# Patient Record
Sex: FEMALE | Race: WHITE | Employment: PART TIME | URBAN - METROPOLITAN AREA
[De-identification: names, ages, dates, MRNs, and addresses within clinical notes are randomized per-mention and may not be internally consistent; named-entity substitution may affect disease eponyms.]

---

## 2021-05-07 ENCOUNTER — OFFICE VISIT (OUTPATIENT)
Dept: PRIMARY CARE CLINIC | Age: 21
End: 2021-05-07

## 2021-05-07 VITALS
TEMPERATURE: 97.6 F | WEIGHT: 146 LBS | SYSTOLIC BLOOD PRESSURE: 133 MMHG | OXYGEN SATURATION: 99 % | HEIGHT: 63 IN | DIASTOLIC BLOOD PRESSURE: 89 MMHG | HEART RATE: 86 BPM | BODY MASS INDEX: 25.87 KG/M2

## 2021-05-07 DIAGNOSIS — U07.1 COVID-19: Primary | ICD-10-CM

## 2021-05-07 LAB
Lab: ABNORMAL
PERFORMING INSTRUMENT: ABNORMAL
QC PASS/FAIL: ABNORMAL
SARS-COV-2, POC: DETECTED

## 2021-05-07 PROCEDURE — 87426 SARSCOV CORONAVIRUS AG IA: CPT | Performed by: PHYSICIAN ASSISTANT

## 2021-05-07 PROCEDURE — 99213 OFFICE O/P EST LOW 20 MIN: CPT | Performed by: PHYSICIAN ASSISTANT

## 2021-05-07 RX ORDER — DESOGESTREL AND ETHINYL ESTRADIOL 0.15-0.03
KIT ORAL
COMMUNITY
Start: 2021-05-06

## 2021-05-07 NOTE — LETTER
[address] [place] [address]  [place], [address]   Phone: [phone]  Fax: [phone]    Giorgi Burt. Yue Galeas PA-C      5/7/2021     Patient: Allan Muñoz   YOB: 2000       To Whom It May Concern: It is my medical opinion that Allan Muñoz should self-quarantine away from work and the [facility] [employer] as they tested positive for COVID-19 in our office today. Return to work with no retesting should be followed if meets these 3 criteria as outlined by CDC/ODH:     a. No fever without the use of fever reducers for 24 hours  b. Improvement in symptoms  c. At least 10 days since the onset of symptoms (5/12/21). If you have any questions or concerns, please don't hesitate to call. Sincerely,        Giorgi Burt.  DUANE Vasquez

## 2021-05-07 NOTE — PROGRESS NOTES
Chief Complaint   Headache (sx started sunday), Generalized Body Aches (Mucinex 8 hours ago), Diarrhea, Other (loss of taste and smell), and Nasal Congestion (rhinorrhea, sore throat)      History of Present Illness   Source of history provided by: patient. Joe Guardian is a 24 y.o. old female who has a past medical history of: History reviewed. No pertinent past medical history. Presents to the walk in clinic for evaluation of headaches, generalized body aches, diarrhea, loss of taste/smell, nasal congestion, rhinorrhea, and sore throat x 6 days. Denies any fever, chills, CP, dyspnea, LE edema, abdominal pain, vomiting, rash, or lethargy. Denies any hx of asthma or tobacco use. Pt denies any known exposure to COVID-19. Pt has not been vaccinated for COVID-19. Pt has been taking Mucinex OTC with minimal symptomatic relief. ROS   Pertinent positives and negatives are stated within HPI, all other systems reviewed and are negative. Surgical History:  has no past surgical history on file. Social History:  reports that she has never smoked. She has never used smokeless tobacco. She reports that she does not drink alcohol or use drugs. Family History: family history is not on file. Allergies: Patient has no known allergies. Physical Exam      VS:  /89   Pulse 86   Temp 97.6 °F (36.4 °C)   Ht 5' 3\" (1.6 m)   Wt 146 lb (66.2 kg)   SpO2 99%   BMI 25.86 kg/m²    Oxygen Saturation Interpretation: Normal.    Constitutional:  Alert, development consistent with age. NAD. Head:  NC/NT. Airway patent. Mouth: Posterior pharynx with mild erythema and clear postnasal drip. No tonsillar hypertrophy or exudate. Neck:  Normal ROM. Supple. No anterior cervical adenopathy noted. Lungs: CTAB without wheezes, rales, or rhonchi. CV:  Regular rate and rhythm, normal heart sounds, without pathological murmurs, ectopy, gallops, or rubs. Skin:  Normal turgor. Warm, dry, without visible rash. Lymphatic: No lymphangitis or adenopathy noted. Neurological:  Oriented. Motor functions intact. Lab / Imaging Results   (All laboratory and radiology results have been personally reviewed by myself)  Labs:  No results found for this visit on 05/07/21. Imaging: All Radiology results interpreted by Radiologist unless otherwise noted. No results found. Medical Decision Making   Pt non-toxic, in no apparent distress and stable at time of discharge. Assessment/Plan   Tina Gary was seen today for headache, generalized body aches, diarrhea, other and nasal congestion. Diagnoses and all orders for this visit:    COVID-19  -     POCT COVID-19, Antigen      Rapid COVID-19 testing is positive in office. Advised strict 10 day quarantine from start of illness. Pt should remain out of school, work, and the general public for at least 10 days from the start of symptoms. Pt should also be fever free for 24 hours and symptoms should be improved overall prior to returning. Increase fluids and rest. Symptomatic relief discussed including Tylenol prn pain/fever. Schedule virtual f/u with PCP in 7-10 days if symptoms persist. ED sooner if symptoms worsen or change. ED immediately with high or refractory fever, progressive SOB, dyspnea, CP, calf pain/swelling, shaking chills, vomiting, abdominal pain, lethargy, flank pain, or decreased urinary output. Pt verbalizes understanding and is in agreement with plan of care. All questions answered. Chayo Vasquez PA-C    This visit was provided as a focused evaluation during the COVID -19 pandemic/national emergency. A comprehensive review of all previous patient history and testing was not conducted. Pertinent findings were elicited during the visit.

## 2021-06-13 ENCOUNTER — HOSPITAL ENCOUNTER (EMERGENCY)
Age: 21
Discharge: HOME | End: 2021-06-13
Attending: FAMILY MEDICINE
Payer: COMMERCIAL

## 2021-06-13 VITALS
BODY MASS INDEX: 24.75 KG/M2 | HEIGHT: 64 IN | DIASTOLIC BLOOD PRESSURE: 64 MMHG | WEIGHT: 145 LBS | OXYGEN SATURATION: 98 % | TEMPERATURE: 97.7 F | SYSTOLIC BLOOD PRESSURE: 112 MMHG | RESPIRATION RATE: 16 BRPM | HEART RATE: 77 BPM

## 2021-06-13 DIAGNOSIS — L03.116 CELLULITIS OF LEFT LOWER EXTREMITY: Primary | ICD-10-CM

## 2021-06-13 DIAGNOSIS — I80.9 PHLEBITIS: ICD-10-CM

## 2021-06-13 LAB
BASOPHILS ABSOLUTE: 0.03 E9/L (ref 0–0.2)
BASOPHILS RELATIVE PERCENT: 0.3 % (ref 0–2)
EOSINOPHILS ABSOLUTE: 0.22 E9/L (ref 0.05–0.5)
EOSINOPHILS RELATIVE PERCENT: 2.2 % (ref 0–6)
HCT VFR BLD CALC: 37.9 % (ref 34–48)
HEMOGLOBIN: 13.5 G/DL (ref 11.5–15.5)
IMMATURE GRANULOCYTES #: 0.03 E9/L
IMMATURE GRANULOCYTES %: 0.3 % (ref 0–5)
LYMPHOCYTES ABSOLUTE: 1.53 E9/L (ref 1.5–4)
LYMPHOCYTES RELATIVE PERCENT: 15.2 % (ref 20–42)
MCH RBC QN AUTO: 33 PG (ref 26–35)
MCHC RBC AUTO-ENTMCNC: 35.6 % (ref 32–34.5)
MCV RBC AUTO: 92.7 FL (ref 80–99.9)
MONOCYTES ABSOLUTE: 0.87 E9/L (ref 0.1–0.95)
MONOCYTES RELATIVE PERCENT: 8.7 % (ref 2–12)
NEUTROPHILS ABSOLUTE: 7.36 E9/L (ref 1.8–7.3)
NEUTROPHILS RELATIVE PERCENT: 73.3 % (ref 43–80)
PDW BLD-RTO: 12 FL (ref 11.5–15)
PLATELET # BLD: 165 E9/L (ref 130–450)
PMV BLD AUTO: 10.9 FL (ref 7–12)
RBC # BLD: 4.09 E12/L (ref 3.5–5.5)
WBC # BLD: 10 E9/L (ref 4.5–11.5)

## 2021-06-13 PROCEDURE — 2580000003 HC RX 258: Performed by: FAMILY MEDICINE

## 2021-06-13 PROCEDURE — 96374 THER/PROPH/DIAG INJ IV PUSH: CPT

## 2021-06-13 PROCEDURE — 6360000002 HC RX W HCPCS: Performed by: FAMILY MEDICINE

## 2021-06-13 PROCEDURE — 36415 COLL VENOUS BLD VENIPUNCTURE: CPT

## 2021-06-13 PROCEDURE — 99283 EMERGENCY DEPT VISIT LOW MDM: CPT

## 2021-06-13 PROCEDURE — 85025 COMPLETE CBC W/AUTO DIFF WBC: CPT

## 2021-06-13 RX ORDER — NAPROXEN 500 MG/1
500 TABLET ORAL 2 TIMES DAILY
Qty: 20 TABLET | Refills: 0 | Status: SHIPPED | OUTPATIENT
Start: 2021-06-13 | End: 2021-06-23

## 2021-06-13 RX ORDER — CEPHALEXIN 500 MG/1
500 CAPSULE ORAL 4 TIMES DAILY
Qty: 40 CAPSULE | Refills: 0 | Status: SHIPPED | OUTPATIENT
Start: 2021-06-13 | End: 2021-06-23

## 2021-06-13 RX ORDER — SULFAMETHOXAZOLE AND TRIMETHOPRIM 800; 160 MG/1; MG/1
1 TABLET ORAL 2 TIMES DAILY
Qty: 14 TABLET | Refills: 0 | Status: SHIPPED | OUTPATIENT
Start: 2021-06-13 | End: 2021-06-20

## 2021-06-13 RX ADMIN — CEFTRIAXONE 1000 MG: 1 INJECTION, POWDER, FOR SOLUTION INTRAMUSCULAR; INTRAVENOUS at 15:38

## 2021-06-13 ASSESSMENT — PAIN DESCRIPTION - DESCRIPTORS: DESCRIPTORS: ACHING

## 2021-06-13 ASSESSMENT — PAIN DESCRIPTION - FREQUENCY: FREQUENCY: CONTINUOUS

## 2021-06-13 ASSESSMENT — PAIN DESCRIPTION - LOCATION: LOCATION: LEG

## 2021-06-13 ASSESSMENT — PAIN DESCRIPTION - PAIN TYPE: TYPE: ACUTE PAIN

## 2021-06-13 ASSESSMENT — PAIN DESCRIPTION - ORIENTATION: ORIENTATION: LEFT

## 2021-06-13 NOTE — ED PROVIDER NOTES
- 99.9 fL    MCH 33.0 26.0 - 35.0 pg    MCHC 35.6 (H) 32.0 - 34.5 %    RDW 12.0 11.5 - 15.0 fL    Platelets 653 757 - 058 E9/L    MPV 10.9 7.0 - 12.0 fL    Neutrophils % 73.3 43.0 - 80.0 %    Immature Granulocytes % 0.3 0.0 - 5.0 %    Lymphocytes % 15.2 (L) 20.0 - 42.0 %    Monocytes % 8.7 2.0 - 12.0 %    Eosinophils % 2.2 0.0 - 6.0 %    Basophils % 0.3 0.0 - 2.0 %    Neutrophils Absolute 7.36 (H) 1.80 - 7.30 E9/L    Immature Granulocytes # 0.03 E9/L    Lymphocytes Absolute 1.53 1.50 - 4.00 E9/L    Monocytes Absolute 0.87 0.10 - 0.95 E9/L    Eosinophils Absolute 0.22 0.05 - 0.50 E9/L    Basophils Absolute 0.03 0.00 - 0.20 E9/L       RADIOLOGY:  Interpreted by Radiologist.  No orders to display       ------------------------- NURSING NOTES AND VITALS REVIEWED ---------------------------   The nursing notes within the ED encounter and vital signs as below have been reviewed. /64   Pulse 77   Temp 97.7 °F (36.5 °C) (Temporal)   Resp 16   Ht 5' 4\" (1.626 m)   Wt 145 lb (65.8 kg)   SpO2 98%   BMI 24.89 kg/m²   Oxygen Saturation Interpretation: Normal      ---------------------------------------------------PHYSICAL EXAM--------------------------------------    Constitutional/General: Alert and oriented x3, well appearing, non toxic in NAD  Head: NC/AT  Eyes: PERRL, EOMI  Mouth: Oropharynx clear, handling secretions, no trismus  Neck: Supple, full ROM, no meningeal signs  Pulmonary: Lungs clear to auscultation bilaterally, no wheezes, rales, or rhonchi. Not in respiratory distress  Cardiovascular:  Regular rate and rhythm, no murmurs, gallops, or rubs. 2+ distal pulses  Abdomen: Soft, non tender, non distended,   Extremities: Moves all extremities x 4. Warm and well perfused; Left lower extremity:  There is an erythematous irregular shaped patch on the left upper anterior thigh and it is warm to touch and mildly tender to palpation.   On the distal lower extremity, the dorsal aspect of the foot is a more linear redness that is not warm to touch but is also tender to palpation. Skin: warm and dry;  Neurologic: GCS 15,  Psych: Normal Affect      ------------------------------ ED COURSE/MEDICAL DECISION MAKING----------------------  Medications   cefTRIAXone (ROCEPHIN) 1,000 mg in sterile water 10 mL IV syringe (1,000 mg Intravenous New Bag 6/13/21 7395)         Medical Decision Making:    Clinical findings today are concerning for cellulitis and phlebitis. She has a skin opening on the plantar aspect, proximal to the fourth toe on the left foot. There is no surrounding erythema and that opening. It is about half a centimeter in length. Patient will be given a dose of IV ceftriaxone and she will be prescribed antibiotics for home. Advised to keep the leg elevated and use warm compresses on and off and to return if any worsening symptoms or she develop any fever chills or body aches. Counseling: The emergency provider has spoken with the patient and discussed todays results, in addition to providing specific details for the plan of care and counseling regarding the diagnosis and prognosis. Questions are answered at this time and they are agreeable with the plan.      --------------------------------- IMPRESSION AND DISPOSITION ---------------------------------    IMPRESSION  1. Cellulitis of left lower extremity    2.  Phlebitis        DISPOSITION  Disposition: Discharge to home  Patient condition is stable                 Chhaya Barrientos MD  06/13/21 1302

## 2021-06-13 NOTE — ED NOTES
Discharge instructions given, patient verbalized their understanding, no other noted or stated problems at this time. Patient will follow up with primary doctor for care.      Estefania Claire RN  06/13/21 4364

## 2023-02-03 ENCOUNTER — HOSPITAL ENCOUNTER (EMERGENCY)
Age: 23
Discharge: HOME | End: 2023-02-03
Attending: EMERGENCY MEDICINE
Payer: COMMERCIAL

## 2023-02-03 ENCOUNTER — APPOINTMENT (OUTPATIENT)
Dept: CT IMAGING | Age: 23
End: 2023-02-03
Payer: COMMERCIAL

## 2023-02-03 VITALS
SYSTOLIC BLOOD PRESSURE: 154 MMHG | RESPIRATION RATE: 16 BRPM | OXYGEN SATURATION: 99 % | HEIGHT: 63 IN | WEIGHT: 144 LBS | TEMPERATURE: 98.3 F | DIASTOLIC BLOOD PRESSURE: 83 MMHG | HEART RATE: 78 BPM | BODY MASS INDEX: 25.52 KG/M2

## 2023-02-03 DIAGNOSIS — M54.2 NECK PAIN: ICD-10-CM

## 2023-02-03 DIAGNOSIS — R51.9 ACUTE NONINTRACTABLE HEADACHE, UNSPECIFIED HEADACHE TYPE: Primary | ICD-10-CM

## 2023-02-03 LAB — HCG(URINE) PREGNANCY TEST: NEGATIVE

## 2023-02-03 PROCEDURE — 96375 TX/PRO/DX INJ NEW DRUG ADDON: CPT

## 2023-02-03 PROCEDURE — 70450 CT HEAD/BRAIN W/O DYE: CPT

## 2023-02-03 PROCEDURE — 99284 EMERGENCY DEPT VISIT MOD MDM: CPT

## 2023-02-03 PROCEDURE — 81025 URINE PREGNANCY TEST: CPT

## 2023-02-03 PROCEDURE — 96374 THER/PROPH/DIAG INJ IV PUSH: CPT

## 2023-02-03 PROCEDURE — 6360000002 HC RX W HCPCS: Performed by: EMERGENCY MEDICINE

## 2023-02-03 PROCEDURE — 2580000003 HC RX 258: Performed by: EMERGENCY MEDICINE

## 2023-02-03 RX ORDER — PROCHLORPERAZINE EDISYLATE 5 MG/ML
10 INJECTION INTRAMUSCULAR; INTRAVENOUS ONCE
Status: COMPLETED | OUTPATIENT
Start: 2023-02-03 | End: 2023-02-03

## 2023-02-03 RX ORDER — KETOROLAC TROMETHAMINE 30 MG/ML
15 INJECTION, SOLUTION INTRAMUSCULAR; INTRAVENOUS ONCE
Status: DISCONTINUED | OUTPATIENT
Start: 2023-02-03 | End: 2023-02-03 | Stop reason: HOSPADM

## 2023-02-03 RX ORDER — CYCLOBENZAPRINE HCL 10 MG
10 TABLET ORAL 3 TIMES DAILY PRN
Qty: 21 TABLET | Refills: 0 | Status: SHIPPED | OUTPATIENT
Start: 2023-02-03 | End: 2023-02-13

## 2023-02-03 RX ORDER — NAPROXEN 250 MG/1
250 TABLET ORAL 2 TIMES DAILY
Qty: 14 TABLET | Refills: 0 | Status: SHIPPED | OUTPATIENT
Start: 2023-02-03 | End: 2023-02-10

## 2023-02-03 RX ORDER — DIPHENHYDRAMINE HYDROCHLORIDE 50 MG/ML
25 INJECTION INTRAMUSCULAR; INTRAVENOUS ONCE
Status: COMPLETED | OUTPATIENT
Start: 2023-02-03 | End: 2023-02-03

## 2023-02-03 RX ORDER — 0.9 % SODIUM CHLORIDE 0.9 %
1000 INTRAVENOUS SOLUTION INTRAVENOUS ONCE
Status: COMPLETED | OUTPATIENT
Start: 2023-02-03 | End: 2023-02-03

## 2023-02-03 RX ADMIN — DIPHENHYDRAMINE HYDROCHLORIDE 25 MG: 50 INJECTION, SOLUTION INTRAMUSCULAR; INTRAVENOUS at 17:13

## 2023-02-03 RX ADMIN — SODIUM CHLORIDE 1000 ML: 9 INJECTION, SOLUTION INTRAVENOUS at 17:12

## 2023-02-03 RX ADMIN — PROCHLORPERAZINE EDISYLATE 10 MG: 5 INJECTION INTRAMUSCULAR; INTRAVENOUS at 17:14

## 2023-02-03 ASSESSMENT — ENCOUNTER SYMPTOMS
EYE REDNESS: 0
VOMITING: 0
ABDOMINAL PAIN: 0
NAUSEA: 0
SHORTNESS OF BREATH: 0

## 2023-02-03 ASSESSMENT — PAIN DESCRIPTION - LOCATION: LOCATION: NECK

## 2023-02-03 ASSESSMENT — PAIN SCALES - GENERAL: PAINLEVEL_OUTOF10: 5
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
## 2024-02-29 DIAGNOSIS — Q21.0 VSD (VENTRICULAR SEPTAL DEFECT) (HHS-HCC): ICD-10-CM

## 2024-02-29 PROBLEM — R07.89 MUSCULOSKELETAL CHEST PAIN: Status: ACTIVE | Noted: 2024-02-29

## 2024-02-29 PROBLEM — R01.1 HEART MURMUR: Status: ACTIVE | Noted: 2024-02-29

## 2024-04-09 RX ORDER — DESOGESTREL AND ETHINYL ESTRADIOL 0.15-0.03
1 KIT ORAL DAILY
COMMUNITY
Start: 2024-02-21

## 2024-04-09 RX ORDER — CETIRIZINE HYDROCHLORIDE 10 MG/1
10 TABLET ORAL DAILY PRN
COMMUNITY
Start: 2024-01-29

## 2024-04-10 ENCOUNTER — TELEPHONE (OUTPATIENT)
Dept: PEDIATRIC CARDIOLOGY | Facility: HOSPITAL | Age: 24
End: 2024-04-10
Payer: COMMERCIAL

## 2024-04-10 NOTE — TELEPHONE ENCOUNTER
04/10/24 at 3:29 PM     Spoke with: Patient   [phone]     Confirmed upcoming appointment as follows:  Dr. Worthy  Location:  [place]  Date: 4/12  Time: echo at 3 and appointment at 4    Reviewed medications and allergies.   Patient confirmed understanding of appointment details, no further questions or issues to be addressed. Encouraged reaching out if there was anything else needed.      - Lily Schrader RN  [facility] Patient Navigator  [phone]

## 2024-04-10 NOTE — PATIENT INSTRUCTIONS
Your echo today looks great.  The VSD is still present - no concerns.  Next visit 5 years or so.    Follow up with: Harshal Worthy MD    Date: 5 years with echo. Our coordinator will contact you closer to this time period to schedule. Our scheduling number is [phone].   Recommendations:   Endocarditis prophylaxis: You do not need antibiotics before dental cleanings and procedures. Please see the dentist every 6 months for a teeth cleaning.     [facility] program contact information:  [facility] Nurse line: [phone]  [facility] Coordinator: [phone] (scheduling)  After hours: call [phone] to page on-call pediatric cardiology fellow at [phone]  Email: [email or web address]  [employer]  **If your pharmacy has any problems requesting refills from us for your cardiac medications, please contact us.

## 2024-04-10 NOTE — PROGRESS NOTES
Patient Name: Angelika Black     Provider: Harshal Worthy MD  : 2000  Date of Service: 2024    DIAGNOSES:     Small perimembranous ventricular septal defect- unrepaired        a. Restrictive left to right shunt    2. Trivial aortic valve insuffiencey     HPI:    I saw Angelika Black today in the [facility], [facility] and [facility] [place] [facility].     Angelika is a 23-year-old female with history of unrepaired small perimembranous VSD. She was last seen by Denita Noble in 2022. At the time, no concerns from cardiac standpoint reported.     She is here today to establish care with [facility].     INTERVAL HISTORY:     Today she reports doing well overall. She had issues with wheezing and difficulty of breathing at night for which she is seeing a doctor and there are concerns about allergies. Denied chest pain chest pain, palpitation, shortness of breath, dyspnea on exertion, orthopnea, or paroxysmal nocturnal dyspnea. Denied cyanosis, exercise intolerance, dizziness, syncope, or peripheral edema. No recent ED visit or hospitalization.    ROS:   General: no fatigue, no fever, no weight loss, no excessive sweating, no decreased appetite  HEENT: no facial swelling, no hoarseness, no eye redness, no eye lid swelling  Cardiac: no fainting, no cyanosis, no chest pain, no palpitations  Respiratory: no shortness of breath, no coughing blood, no noisy breathing, no wheezing, no cough  GI: No abdomen pain, no constipation, no diarrhea, no vomiting  Musculoskeletal: no extremity swelling  Skin: no paleness, no rash, no yellow skin  Hematologic: no easy bruising, no easy bleeding  Neurologic: no seizures, no weakness    All systems negative unless otherwise stated.    Past Medical History:   Perimembranous VSD    Social History: Lives with her mother. Collage student at [employer]. She cleans homes for a living.   She vapes  "and smokes MONOCO daily. She drinks socially.   Sexually active on OCP     Family History: Negative for congenital heart disease, sudden death, myocardial infarction at young age, arrhythmia, pacemaker/ICD, long QT syndrome, cardiomyopathy, deafness, seizures.     CURRENT MEDS:    Current Outpatient Medications:     Apri 0.15-0.03 mg tablet, Take 1 tablet by mouth once daily., Disp: , Rfl:     cetirizine (ZyrTEC) 10 mg tablet, Take 1 tablet (10 mg) by mouth once daily as needed., Disp: , Rfl:     PHYSICAL EXAM:  /77 (BP Location: Right arm, Patient Position: Sitting, BP Cuff Size: Adult)   Pulse 68   Ht 1.635 m (5' 4.37\")   Wt 65.9 kg (145 lb 6.3 oz)   SpO2 99%   BMI 24.67 kg/m²   Body mass index is 24.67 kg/m².    General: Well appearing and in no distress  HEENT: Moist mucous membranes  Neck: Supple, JVP normal, Carotid upstrokes brisk bilaterally and without bruits  Respiratory: Clear to ausculation bilaterally; no wheezing/rales/rhonchi; respirations non-labored  Cardiovascular: RRR,normal PMI, normal S1 and S2. No S3 or S4. No murmurs or rubs.   Gastrointestinal: soft, non-tender, no organomegaly, no abnormal aortic pulsation  Extremities: warm and well perfused with no cyanosis, clubbing, or edema  Neurologic: awake/alert, no focal deficits    DATA:    TTE 4/12/2024: Images were reviewed and interpreted by attending Dr. Worthy    - Small perimembranous VSD with restrictive left to right flow.   - Trivial AI  - Mild PI  - Normal biventricular size and function     TTE 12/11/2019:  1. Small perimembranous ventricular septal defect with restrictive left to right shunt.   2. Peak velocity across the VSD is 4.93 m/s with a peak instantaneous gradient of 97.2 mmHg.  3. Cannot exclude trivial left ventricle to right atrium shunt.  4. Fenestrated atrial septum with small left to right shunt, limited visualization- best viewed from the right parasternal window.  5. Left ventricle is normal in size. " Normal systolic function.  6. Qualitatively normal right ventricular size and normal systolic function.  7. No pericardial effusion.      ASSESSMENT & PLAN:      Angelika is a 23-year-old female with history of unrepaired small perimembranous VSD, who is here today to establish care with [facility].     Unrepaired small perimembranous ventricular septal defect- unrepaired     Today's echocardiogram continues to show small perimembranous VSD and PFO with left-to-right flow. It also shows trivial aortic valve insuffiencey and normal left ventricular size and function.     These finding are reassuring and do not demonstrates any indication for intervention at this point. Periodic echocardiograms are required to evaluate the function of the aortic valve.     2. Pregnancy- on OCP    From cardiac standpoint no contraindications for future pregnancy at this point. Pre-conception echocardiogram is recommended. Given the slight increased risk for off spring to have VSD or left side congenital heart defect, a fetal echocardiogram can be offered during pregnancy.      - No indication for medications or intervention from cardiac standpoint   - From cardiac standpoint, no restrictions to physical activities   - SBE prophylaxis is not indicated  - Follow up in 5 years with echocardiogram       The patient was seen and examined with the attending cardiologist Dr. Zaynab Norris MD PGY- 6  Pediatric Cardiology Fellow    I saw and evaluated the patient. I personally obtained the key and critical portions of the history and physical exam or was physically present for key and critical portions performed by the resident/fellow. I reviewed the resident/fellow's documentation and discussed the patient with the resident/fellow. I agree with the resident/fellow's medical decision making as documented in the note.    Harshal Worthy MD     Thank you for allowing me to participate in the care of this patient.  Please don't hesitate to  contact us with any questions or concerns.    Sincerely,    Harshal Worthy MD, MSc  Director, Adult Congenital Heart Disease Program  [facility] [place]

## 2024-04-12 ENCOUNTER — ANCILLARY PROCEDURE (OUTPATIENT)
Dept: PEDIATRIC CARDIOLOGY | Facility: CLINIC | Age: 24
End: 2024-04-12
Payer: COMMERCIAL

## 2024-04-12 ENCOUNTER — OFFICE VISIT (OUTPATIENT)
Dept: PEDIATRIC CARDIOLOGY | Facility: CLINIC | Age: 24
End: 2024-04-12
Payer: COMMERCIAL

## 2024-04-12 VITALS
WEIGHT: 145.39 LBS | BODY MASS INDEX: 24.82 KG/M2 | DIASTOLIC BLOOD PRESSURE: 77 MMHG | HEIGHT: 64 IN | OXYGEN SATURATION: 99 % | SYSTOLIC BLOOD PRESSURE: 133 MMHG | HEART RATE: 68 BPM

## 2024-04-12 DIAGNOSIS — Q21.12 PATENT FORAMEN OVALE (HHS-HCC): ICD-10-CM

## 2024-04-12 DIAGNOSIS — Q21.0 VSD (VENTRICULAR SEPTAL DEFECT) (HHS-HCC): Primary | ICD-10-CM

## 2024-04-12 DIAGNOSIS — Q21.0 VSD (VENTRICULAR SEPTAL DEFECT) (HHS-HCC): ICD-10-CM

## 2024-04-12 LAB
AORTIC VALVE PEAK GRADIENT PEDS: 3.83 MM2
AORTIC VALVE PEAK VELOCITY: 1.04 M/S
AV PEAK GRADIENT: 4.3 MMHG
EJECTION FRACTION APICAL 4 CHAMBER: 61
MITRAL VALVE E/A RATIO: 1.47
MITRAL VALVE E/E' RATIO: 3.93
PULMONIC VALVE PEAK GRADIENT: 2.4 MMHG
TRICUSPID ANNULAR PLANE SYSTOLIC EXCURSION: 2.2 CM

## 2024-04-12 PROCEDURE — 99214 OFFICE O/P EST MOD 30 MIN: CPT | Performed by: INTERNAL MEDICINE

## 2024-04-12 NOTE — LETTER
2024     Farhan Connolly DO  [address] [facility] [address]    Patient: Angelika Black   YOB: 2000   Date of Visit: 2024       Dear Dr. Farhan Connolly DO:    Thank you for referring Angelika Black to me for evaluation. Below are my notes for this consultation.  If you have questions, please do not hesitate to call me. I look forward to following your patient along with you.       Sincerely,     Harshal Worthy MD      CC: No Recipients  ______________________________________________________________________________________    Patient Name: Angelika Black     Provider: Harshal Worthy MD  : 2000  Date of Service: 2024    DIAGNOSES:     Small perimembranous ventricular septal defect- unrepaired        a. Restrictive left to right shunt    2. Trivial aortic valve insuffiencey     HPI:    I saw Angelika Black today in the [facility], [facility] and [facility] [place] at [facility].     Angelika is a 23-year-old female with history of unrepaired small perimembranous VSD. She was last seen by Denita Noble in 2022. At the time, no concerns from cardiac standpoint reported.     She is here today to establish care with [facility].     INTERVAL HISTORY:     Today she reports doing well overall. She had issues with wheezing and difficulty of breathing at night for which she is seeing a doctor and there are concerns about allergies. Denied chest pain chest pain, palpitation, shortness of breath, dyspnea on exertion, orthopnea, or paroxysmal nocturnal dyspnea. Denied cyanosis, exercise intolerance, dizziness, syncope, or peripheral edema. No recent ED visit or hospitalization.    ROS:   General: no fatigue, no fever, no weight loss, no excessive sweating, no decreased appetite  HEENT: no facial swelling, no hoarseness, no eye redness, no eye lid  "swelling  Cardiac: no fainting, no cyanosis, no chest pain, no palpitations  Respiratory: no shortness of breath, no coughing blood, no noisy breathing, no wheezing, no cough  GI: No abdomen pain, no constipation, no diarrhea, no vomiting  Musculoskeletal: no extremity swelling  Skin: no paleness, no rash, no yellow skin  Hematologic: no easy bruising, no easy bleeding  Neurologic: no seizures, no weakness    All systems negative unless otherwise stated.    Past Medical History:   Perimembranous VSD    Social History: Lives with her mother. Collage student at [employer]. She cleans homes for a living.   She vapes and smokes mariajuana daily. She drinks socially.   Sexually active on OCP     Family History: Negative for congenital heart disease, sudden death, myocardial infarction at young age, arrhythmia, pacemaker/ICD, long QT syndrome, cardiomyopathy, deafness, seizures.     CURRENT MEDS:    Current Outpatient Medications:   •  Apri 0.15-0.03 mg tablet, Take 1 tablet by mouth once daily., Disp: , Rfl:   •  cetirizine (ZyrTEC) 10 mg tablet, Take 1 tablet (10 mg) by mouth once daily as needed., Disp: , Rfl:     PHYSICAL EXAM:  /77 (BP Location: Right arm, Patient Position: Sitting, BP Cuff Size: Adult)   Pulse 68   Ht 1.635 m (5' 4.37\")   Wt 65.9 kg (145 lb 6.3 oz)   SpO2 99%   BMI 24.67 kg/m²   Body mass index is 24.67 kg/m².    General: Well appearing and in no distress  HEENT: Moist mucous membranes  Neck: Supple, JVP normal, Carotid upstrokes brisk bilaterally and without bruits  Respiratory: Clear to ausculation bilaterally; no wheezing/rales/rhonchi; respirations non-labored  Cardiovascular: RRR,normal PMI, normal S1 and S2. No S3 or S4. No murmurs or rubs.   Gastrointestinal: soft, non-tender, no organomegaly, no abnormal aortic pulsation  Extremities: warm and well perfused with no cyanosis, clubbing, or edema  Neurologic: awake/alert, no focal deficits    DATA:    TTE 4/12/2024: Images were reviewed and " interpreted by attending Dr. Worthy    - Small perimembranous VSD with restrictive left to right flow.   - Trivial AI  - Mild PI  - Normal biventricular size and function     TTE 12/11/2019:  1. Small perimembranous ventricular septal defect with restrictive left to right shunt.   2. Peak velocity across the VSD is 4.93 m/s with a peak instantaneous gradient of 97.2 mmHg.  3. Cannot exclude trivial left ventricle to right atrium shunt.  4. Fenestrated atrial septum with small left to right shunt, limited visualization- best viewed from the right parasternal window.  5. Left ventricle is normal in size. Normal systolic function.  6. Qualitatively normal right ventricular size and normal systolic function.  7. No pericardial effusion.      ASSESSMENT & PLAN:      Angelika is a 23-year-old female with history of unrepaired small perimembranous VSD, who is here today to establish care with [facility].     Unrepaired small perimembranous ventricular septal defect- unrepaired     Today's echocardiogram continues to show small perimembranous VSD and PFO with left-to-right flow. It also shows trivial aortic valve insuffiencey and normal left ventricular size and function.     These finding are reassuring and do not demonstrates any indication for intervention at this point. Periodic echocardiograms are required to evaluate the function of the aortic valve.     2. Pregnancy- on OCP    From cardiac standpoint no contraindications for future pregnancy at this point. Pre-conception echocardiogram is recommended. Given the slight increased risk for off spring to have VSD or left side congenital heart defect, a fetal echocardiogram can be offered during pregnancy.      - No indication for medications or intervention from cardiac standpoint   - From cardiac standpoint, no restrictions to physical activities   - SBE prophylaxis is not indicated  - Follow up in 5 years with echocardiogram       The patient was seen and examined with the  attending cardiologist Dr. Zaynab Norris MD PGY- 6  Pediatric Cardiology Fellow    I saw and evaluated the patient. I personally obtained the key and critical portions of the history and physical exam or was physically present for key and critical portions performed by the resident/fellow. I reviewed the resident/fellow's documentation and discussed the patient with the resident/fellow. I agree with the resident/fellow's medical decision making as documented in the note.    Harshal Worthy MD     Thank you for allowing me to participate in the care of this patient.  Please don't hesitate to contact us with any questions or concerns.    Sincerely,    Harshal Worthy MD, MSc  Director, Adult Congenital Heart Disease Program  [facility] [place]